# Patient Record
Sex: FEMALE | Race: WHITE | NOT HISPANIC OR LATINO | Employment: FULL TIME | URBAN - METROPOLITAN AREA
[De-identification: names, ages, dates, MRNs, and addresses within clinical notes are randomized per-mention and may not be internally consistent; named-entity substitution may affect disease eponyms.]

---

## 2017-01-04 ENCOUNTER — OFFICE VISIT - RIVER FALLS (OUTPATIENT)
Dept: FAMILY MEDICINE | Facility: CLINIC | Age: 37
End: 2017-01-04

## 2017-01-04 ASSESSMENT — MIFFLIN-ST. JEOR: SCORE: 1374.38

## 2017-06-02 ENCOUNTER — OFFICE VISIT - RIVER FALLS (OUTPATIENT)
Dept: FAMILY MEDICINE | Facility: CLINIC | Age: 37
End: 2017-06-02

## 2017-06-02 ASSESSMENT — MIFFLIN-ST. JEOR: SCORE: 1357.14

## 2017-12-21 ENCOUNTER — OFFICE VISIT - RIVER FALLS (OUTPATIENT)
Dept: FAMILY MEDICINE | Facility: CLINIC | Age: 37
End: 2017-12-21

## 2018-02-23 ENCOUNTER — OFFICE VISIT - RIVER FALLS (OUTPATIENT)
Dept: FAMILY MEDICINE | Facility: CLINIC | Age: 38
End: 2018-02-23

## 2018-02-23 ASSESSMENT — MIFFLIN-ST. JEOR: SCORE: 1373.13

## 2018-03-09 ENCOUNTER — OFFICE VISIT - RIVER FALLS (OUTPATIENT)
Dept: FAMILY MEDICINE | Facility: CLINIC | Age: 38
End: 2018-03-09

## 2022-02-11 VITALS
HEIGHT: 68 IN | TEMPERATURE: 97.8 F | SYSTOLIC BLOOD PRESSURE: 102 MMHG | DIASTOLIC BLOOD PRESSURE: 68 MMHG | HEART RATE: 72 BPM | WEIGHT: 146.8 LBS | BODY MASS INDEX: 22.25 KG/M2

## 2022-02-12 VITALS
HEIGHT: 68 IN | WEIGHT: 143 LBS | BODY MASS INDEX: 21.67 KG/M2 | DIASTOLIC BLOOD PRESSURE: 60 MMHG | TEMPERATURE: 98 F | SYSTOLIC BLOOD PRESSURE: 98 MMHG | HEART RATE: 72 BPM

## 2022-02-12 VITALS
BODY MASS INDEX: 22.34 KG/M2 | TEMPERATURE: 98.2 F | SYSTOLIC BLOOD PRESSURE: 94 MMHG | HEIGHT: 68 IN | WEIGHT: 147.4 LBS | DIASTOLIC BLOOD PRESSURE: 56 MMHG | HEART RATE: 68 BPM

## 2022-02-12 VITALS — TEMPERATURE: 99 F

## 2022-02-15 NOTE — PROGRESS NOTES
Chief Complaint    c/o feels like there is something stuck in her throat x 2 months  History of Present Illness      Patient comes in for a couple of reasons.  First for the last 2 months she is feels like there is something stuck in the back of her throat.  It is only on the right side and it appears to be in the tonsillar region.  She has no trouble swallowing per se.  But hard foods tend to be worse than soft foods.  She does not recall injuring it at all.  She has not been ill.       In addition to this her PHQ 9 is 13.  She works 12 hour shifts at [employer] and rotates between days and nights.  This affects her sleep cycle.  I think this is a major factor contributing to this.  Review of Systems      Review of systems is negative for any fever chills night sweats, weight gain or weight loss.      She has no homicidal or suicidal ideation.  Physical Exam   Vitals & Measurements    T: 98.2(Tympanic)  HR: 68(Peripheral)  BP: 94/56     HT: 67.75 in  WT: 147.4 lb  BMI: 22.58       Physical exam shows vital signs stable afebrile no apparent distress.      HEENT shows TMs clear nose clear pharynx moist.  Where she has sores on the posterior tonsillar bed.  I do not see anything unusual here.       Neck supple       Lungs clear       CV regular       Psychiatric shows more sleep deprivation and depression  Assessment/Plan       Dysphagia         Because this is gone on for 2 months and has not resolved we will get her set up to see Dr. Mann in [place].       Insomnia         We will have her use some melatonin 5 mg and try and regulate her sleep cycle.  She will let me know how this works.        More than half of the 25 minute visit was spent coordinating care and counseling on the above problems.       Orders:  Patient Information     Name:GURPREET ARREDONDO      Address:       [address] [place] [id number]     Sex:Female     YOB: 1980     [phone]     MRN:[id number]      FIN:[id number]     Location:[facility]     Date of Service:02/23/2018      Primary Care Physician:       Keven CHRISTINA [place], [phone]  Problem List/Past Medical History    Ongoing     No qualifying data    Historical     Childbirth      Comments: X 2     Pregnancy     Pregnancy     Pregnancy  Procedure/Surgical History     right hip surgery (03/01/2016)     Vaginal delivery (01/05/2008)     Vaginal delivery (10/28/2003)     Vaginal delivery (04/30/2001)  Medications        ibuprofen: PRN: as needed for pain, 0 Refill(s).                Allergies    No Known Medication Allergies  Social History    Smoking Status - 02/23/2018     Never smoker     Alcohol - Current, 03/19/2013      1-2 times per month, 1 drinks/episode average.     Employment and Education - 03/19/2013      [employer] assistant/ [employer]     Exercise and Physical Activity - Regular exercise, 03/19/2013      Exercise frequency: 3-4 times/week. Exercise type: Bicycling, Running, Swimming, Weight lifting.     Home and Environment - 03/19/2013      Marital status: . Spouse/Partner name: Toño. Lives with Children, Spouse. 3 children. Living situation: Home/Independent. Smoker in household: No.     Nutrition and Health - 03/19/2013      Type of diet: Regular. Caffeine intake amount: 3 Cups of Coffee a week.     Other - 03/19/2013      First menses age 10. Regular menses, duation 4 days. Cycle interval 28 days. No history of abnormal pap smear.     Sexual - 03/19/2013      Sexually active: Yes. Number of current partners: 1. Sexual orientation: Heterosexual. Uses condoms: No. Other contraceptive use: Vasectomy for .     Substance Abuse - Denies Substance Abuse, 03/19/2013      Never     Tobacco - Denies Tobacco Use, 03/19/2013      Never  Family History    CA - Breast cancer: Aunt (P).    Diabetes mellitus type 2: Grandfather (P).    Hypercholesterolemia: Father.    Hypothyroidism: Mother.    Obesity:  Mother.  Immunizations      Vaccine Date Status Comments      tetanus/diphth/pertuss (Tdap) adult/adol 07/14/2016 Given [7/14/2016] Pt consented      Td 10/13/2005 Recorded  Lab Results      Results (Last 90 days)      No results located.

## 2022-02-15 NOTE — NURSING NOTE
Phone Message    PCP:   CHT      Time of Call:  1:12 pm       Person Calling:  pt  Phone number:  [phone]    Returned call at: 1:35 pm    Note:   Pt calls wondering if she has had the Hep B vaccine series here at the clinic. LM notifying her that we don't have that she has had these.

## 2022-02-15 NOTE — PROGRESS NOTES
Patient:   GURPREET ARREDONDO            MRN: [id number]            FIN: [id number]               Age:   36 years     Sex:  Female     :  1980   Associated Diagnoses:   None   Author:   Adán Baca MD       -   Today's date:  2017 10:12:00 AM .        -   To whom it may concern:        This patient is currently under my care and Was seen in my office on  2017.  .     Please excuse him/ her from work, 2017.  He/ she may return to work, on  6/3/2017, Friday night.  Follow up as needed   Please contact me if you have any questions or concerns.      -   Sincerely,             Adán Baca MD  [address]  [address] [phone]

## 2022-02-15 NOTE — TELEPHONE ENCOUNTER
---------------------  From: Latrice Yates CMA (Phone Messages Pool ([id number]_[place] Maliha Engel))   To: Adán Baca MD;     Sent: 2/20/2019 1:42:20 PM CST  Subject: phone message      PCP:   CHT      Time of Call:  140pm       Person Calling:  Jazmine  Phone number:  [phone]    Returned call at:     Note:   patient would like a call back from T to discuss a matter about herself. please advise    Last office visit and reason:       Pharmacy:     [facility] to: T  ** Submitted: **  Order:azithromycin (azithromycin 250 mg oral tablet)  1 packet(s)  PO  Once  as directed on package labeling  Qty:  6 tab(s)        Refills:  0          Substitutions Allowed     Route To Pharmacy - [facility] [id number] [facility]    Signed by Adán Baca MD  2/20/2019 2:46:00 PM---------------------  From: Adán Baca MD   To: Phone Messages Pool ([id number]_DAVE Engel);     Sent: 2/20/2019 2:46:37 PM CST  Subject: RE: phone message      Will come in if not resolving .noted

## 2022-02-15 NOTE — PROGRESS NOTES
Patient:   GURPREET ARREDONDO            MRN: [id number]            FIN: [id number]               Age:   36 years     Sex:  Female     :  1980   Associated Diagnoses:   Preop testing; Femoral hernia   Author:   Gonzalo Pittman PA-C      Preoperative Information   Indication for surgery   Femoral hernia. Present since last pregnancy. Active person. More symptoms with activity.   Accompanied by   Source of history          Chief Complaint      Review of Systems   Constitutional:  Negative.    Eye:  Negative.    Ear/Nose/Mouth/Throat:  Negative.    Respiratory:  Negative.    Cardiovascular:  Negative.    Gastrointestinal:  Negative.    Genitourinary:  Negative.    Hematology/Lymphatics:  Negative.    Endocrine:  Negative.    Immunologic:  Negative.    Musculoskeletal:  Negative.    Integumentary:  Negative.    Neurologic:  Negative.    Psychiatric:  Negative.    All other systems reviewed and negative      Health Status   Allergies:    Allergic Reactions (Selected)  No known allergies   Medications:  (Selected)   Documented Medications  Documented  ibuprofen: PRN: as needed for pain, 0 Refill(s), Type: Maintenance   Problem list:    All Problems  Resolved: Childbirth / SNOMED CT 7551597112  Resolved: Pregnancy / SNOMED CT 633598004  Resolved: Pregnancy / SNOMED CT 695315878  Resolved: Pregnancy / SNOMED CT 786278790      Histories   Past Medical History:    Resolved  Childbirth (7829839589):  Resolved.  Comments:  2010 CDT 2:14 PM CDT - Maryan Clinton  X 2  Pregnancy (057284769):  Resolved on 2001 at 20 years.  Pregnancy (633062935):  Resolved on 10/28/2003 at 23 years.  Pregnancy (619012458):  Resolved on 2008 at 27 years.   Family History:    CA - Breast cancer  Aunt (P)  Hypothyroidism  Mother  Diabetes mellitus type 2  Grandfather (P)  Hypercholesterolemia  Father  Obesity  Mother     Procedure history:    right hip surgery on 3/1/2016 at 35 Years.  Vaginal delivery (650) on 2008 at 27  Years.  Vaginal delivery (650) on 10/28/2003 at 23 Years.  Vaginal delivery (650) on 4/30/2001 at 20 Years.   Social History:        Alcohol Assessment: Current            1-2 times per month, 1 drinks/episode average.      Tobacco Assessment: Denies Tobacco Use            Never      Substance Abuse Assessment: Denies Substance Abuse            Never      Employment and Education Assessment            [employer] assistant/ x-ray tech      Home and Environment Assessment            Marital status: .  Spouse/Partner name: Toño.  Lives with Children, Spouse.  3 children.  Living               situation: Home/Independent.  Smoker in household: No.      Nutrition and Health Assessment            Type of diet: Regular.  Caffeine intake amount: 3 Cups of Coffee a week.      Exercise and Physical Activity Assessment: Regular exercise            Exercise frequency: 3-4 times/week.  Exercise type: Bicycling, Running, Swimming, Weight lifting.      Sexual Assessment            Sexually active: Yes.  Number of current partners: 1.  Sexual orientation: Heterosexual.  Uses condoms: No.               Other contraceptive use: Vasectomy for .      Other Assessment            First menses age 10.  Regular menses, duation 4 days.  Cycle interval 28 days.  No history of abnormal pap               smear.       Has a / no history of anemia.  Has a / no history of DVT or pulmonary embolism.  Has a / no personal history of bleeding problems.   Has a / no personal or family history of anesthesia reactions.  Patient does / does not have active tuberculosis.    S/he has / has not taken aspirin or aspirin containing products in the last week.     S/he has / has not taken Plavix (Clopidogrel) in the last 2 weeks.    S/he has / has not taken warfarin in the past week.    S/he has / has not been on corticosteroids for more than 2 weeks recently.      S/he  is not DNR before, during or after surgery.    Chest pain / SOB walking up 2  flights of steps? NO  Pain in neck or jaw? NO   CAD MI?  NO  Afib? NO  Heart Failure?NO  Asthma  or Bronchitis? NO  Diabetes? NO      Seizure Disorder? NO  CKD? NO  Thyroid Disease? NO  Liver Disease NO  CVA? NO   No TAMRA      Physical Examination   Vital Signs   1/4/2017 8:01 AM CST Temperature Temporal 97.8 DegF    Peripheral Pulse Rate 72 bpm    Pulse Site Radial artery    Systolic Blood Pressure 102 mmHg    Diastolic Blood Pressure 68 mmHg    Mean Arterial Pressure 79 mmHg    BP Site Left arm    BP Method Manual      Measurements from flowsheet : Measurements   1/4/2017 8:01 AM CST Height Measured - Standard 68 in    Weight Measured - Standard 146.8 lb    BSA 1.79 m2    Body Mass Index 22.32 kg/m2      General:  Alert and oriented, No acute distress.    Eye:  Pupils are equal, round and reactive to light, Extraocular movements are intact, Normal conjunctiva.    HENT:  Normocephalic, Tympanic membranes are clear, Oral mucosa is moist, No pharyngeal erythema.    Neck:  Supple, Non-tender, No lymphadenopathy.    Respiratory:  Lungs are clear to auscultation, Respirations are non-labored, Breath sounds are equal.    Cardiovascular:  Normal rate, Regular rhythm, No murmur.    Gastrointestinal:  Soft, Non-tender, Non-distended, Normal bowel sounds, No organomegaly.    Genitourinary:  No costovertebral angle tenderness.    Integumentary:  No rash.    Neurologic:  No focal deficits, Normal deep tendon reflexes.    Psychiatric:  Cooperative, Appropriate mood & affect.       Review / Management              Impression and Plan   Diagnosis     Preop testing (TMR68-QW Z01.818).     Femoral hernia (QWS27-UW K41.90).     Condition:  Stable.    No known contraindications to the planned surgical procedure.

## 2022-02-15 NOTE — PROGRESS NOTES
Patient:   GURPREET ARREDONDO            MRN: [id number]            FIN: [id number]               Age:   37 years     Sex:  Female     :  1980   Associated Diagnoses:   None   Author:   Reggie Mann MD      Visit Information      Primary Care Provider (PCP):  Adán Baca MD    NPI# [id number]      Referring Provider:  Reggie Mann MD    NPI# [id number]      Chief Complaint   3/9/2018 3:40 PM CST     Feels like she has something stuck in throat all the time.        History of Present Illness   Asked to see by Dr. Baca for evaluation of her throat.  The patient feels like there is something in the throat.  It has been there perhaps since last August.  She feels like someone is squeezing the throat.  It feels like something hard in the throat.  It feels like it is there all of the time and is always in the same spot.  No triggering events.  Food goes down normally.  It is less noticeable after eating.  No change in voice.  Symptoms are worse with working out.        Review of Systems   Constitutional:  Negative.    Ear/Nose/Mouth/Throat:  Negative except as documented in history of present illness.    Respiratory:  Negative.       Health Status   Allergies:    Allergic Reactions (Selected)  No Known Medication Allergies   Medications:  (Selected)   Documented Medications  Documented  ibuprofen: PRN: as needed for pain, 0 Refill(s), Type: Maintenance      Histories   Past Medical History:    Resolved  Childbirth (8839002919):  Resolved.  Comments:  2010 CDT 2:14 PM CDT - Maryan Clinton 2  Pregnancy (547790496):  Resolved on 2001 at 20 years.  Pregnancy (550896657):  Resolved on 10/28/2003 at 23 years.  Pregnancy (059879117):  Resolved on 2008 at 27 years.   Family History:    CA - Breast cancer  Aunt (P)  Hypothyroidism  Mother  Diabetes mellitus type 2  Grandfather (P)  Hypercholesterolemia  Father  Obesity  Mother     Procedure history:    right hip surgery on 3/1/2016 at 35  Years.  Vaginal delivery (650) on 1/5/2008 at 27 Years.  Vaginal delivery (650) on 10/28/2003 at 23 Years.  Vaginal delivery (650) on 4/30/2001 at 20 Years.      Physical Examination   Vital Signs   3/9/2018 3:40 PM CST     Temperature Tympanic      99.0 DegF     CONSTITUTIONAL  General Appearance:  Normal, well developed, well nourished, no obvious distress  Ability to Communicate:  communicates appropriately.    HEAD AND FACE  Appearance and Symmetry:  Normal, no scalp or facial scarring or suspicious lesions.  Paranasal sinuses tenderness:  Normal, Paranasal sinuses non tender    EARS  Clinical speech reception threshold:  Normal, able to hear normal speech.  Auricle:  Normal, Auricles without scars, lesions, masses.  External auditory canal:  Normal, External auditory canal normal.  Tympanic membrane:  Normal, Tympanic membranes normal without swelling or erythema.  Tympanic membrane mobility:  Normal, Normal tympanic membrane mobility.    NOSE (speculum or scope)  Architecture:  Normal, Grossly normal external nasal architecture with no masses or lesions.  Mucosa:  Normal mucosa, No polyps or masses.  Septum:  Normal, Septum non-obstructing.  Turbinates:  Normal, No turbinate abnormalities    ORAL CAVITY AND OROPHARYNX  Lips:  Normal.  Dental and gingiva:  Normal, No obvious dental or gingival disease.  Mucosa:  Normal, Moist mucous membranes.  Tongue:  Normal, Tongue mobile with no mucosal abnormalities  Hard and soft palate:  Normal, Hard and soft palate without cleft or mucosal lesions.  Oral pharynx:  Normal, Posterior pharynx without lesions or remarkable asymmetry.  Saliva:  Normal, Clear saliva.  Masses:  Normal, No palpable masses or pathologically enlarged lymph nodes.    LARYNGOSCOPY  Risks and benefit of Flexible laryngeal endoscopy were discussed with the patient and they wished to proceed.  The nose was sprayed with 4% lidocaine / 1% phenylephrine.  After allowing adequate time for anesthesia the  procedure was started.  Patient tolerated the procedure well.  See exam note for findings.    LARYNX AND HYPOPHARYNX (mirror or scope)  Voice Quality:  Normal voice quality.  Supra glottis:  Normal, No edema or erythema.  Epiglottis:  Normal, No epiglottic mass or mucosal lesions.  Pyriform sinuses:  Normal, Pyriform sinuses clear.  Vocal cords appearance:  Normal, Vocal cord motion symmetric.  Mild erythema of the arytenoids  Vocal cord motion:  Normal, Vocal cord motion symmetric  Endolarynx:  Normal, No Endolaryngeal mass or mucosal lesions.    NECK  Masses/lymph nodes:  Normal, No worrisome neck masses or lymph nodes.  She reports that the discomfort is in the area of the right upper neck.  When I palpate the right hyoid, she confirms that that is the location of the sensation.  Palpation of the hyoid in the pharynx is also unremarkable.  Salivary glands:  Normal, Parotid and submandibular glands.  Trachea and larynx position:  Normal, Trachea and larynx midline.  Thyroid:  Normal, No thyroid abnormality.  Tenderness:  Normal, No cervical tenderness.  Suppleness:  Normal, Neck supple    NEUROLOGICAL  Speech pattern:  Normal, Proasaic    RESPIRATORY  Symmetry and Respiratory effort:  Normal, Symmetric chest movement and expansion with no increased intercostal retractions or use of accessory muscles.       Impression and Plan   The fact that her discomfort is worse with exercise suggests that there may be a musculoskeletal component of the pain from the muscular connections to the right hyoid.  I advised NSAIDS and heating pad.  If the symptoms worsene over time then she will need a CT soft tissue neck with contrast.

## 2022-02-15 NOTE — PROGRESS NOTES
Patient:   GURPREET ARREDONDO            MRN: [id number]            FIN: [id number]               Age:   36 years     Sex:  Female     :  1980   Associated Diagnoses:   Left acute suppurative otitis media   Author:   Adán aBca MD      Visit Information      Date of Service: 2017 09:56 am  Performing Location: [facility]  Encounter#: [id number]      Primary Care Provider (PCP):  Adán Baca MD    NPI# [id number]      Referring Provider:  Adán Baca MD    NPI# [id number]      Chief Complaint   2017 9:57 AM CDT     c/o cold, congestion x 1 week; L ear pain, drainage started 2 days     Chief complaint and symptoms noted above confirmed with patient.      History of Present Illness             The patient presents with earache.  The location of the earache is the left ear.  The earache is characterized by pain, burning and discharge.  The severity of the earache is moderate.  The earache is constant.  The earache has lasted for 2 day(s).  Exacerbating factors consist of none.  Relieving factors consist of analgesics.        Review of Systems   Constitutional:  Negative.    Ear/Nose/Mouth/Throat:  Negative except as documented in history of present illness.    Respiratory:  Cough.    Cardiovascular:  Negative.    Gastrointestinal:  Negative.       Health Status   Allergies:    Allergic Reactions (Selected)  No Known Medication Allergies   Medications:  (Selected)   Documented Medications  Documented  ibuprofen: PRN: as needed for pain, 0 Refill(s), Type: Maintenance      Histories   Past Medical History:    Resolved  Childbirth (SNOMED CT 1307913707):  Resolved.  Comments:  2010 CDT 2:14 PM CDT - Maryan Clinton  X 2  Pregnancy (SNOMED CT 592664584):  Resolved on 2001 at 20 years.  Pregnancy (SNOMED CT 443203930):  Resolved on 10/28/2003 at 23 years.  Pregnancy (SNOMED CT 886358209):  Resolved on 2008 at 27 years.   Family History:    CA - Breast  cancer  Aunt (P)  Hypothyroidism  Mother  Diabetes mellitus type 2  Grandfather (P)  Hypercholesterolemia  Father  Obesity  Mother     Procedure history:    right hip surgery on 3/1/2016 at 35 Years.  Vaginal delivery (ICD-9-) on 1/5/2008 at 27 Years.  Vaginal delivery (ICD-9-) on 10/28/2003 at 23 Years.  Vaginal delivery (ICD-9-) on 4/30/2001 at 20 Years.   Social History:        Alcohol Assessment: Current            1-2 times per month, 1 drinks/episode average.      Tobacco Assessment: Denies Tobacco Use            Never      Substance Abuse Assessment: Denies Substance Abuse            Never      Employment and Education Assessment            Chiro assistant/ x-ray tech      Home and Environment Assessment            Marital status: .  Spouse/Partner name: Toño.  Lives with Children, Spouse.  3 children.  Living               situation: Home/Independent.  Smoker in household: No.      Nutrition and Health Assessment            Type of diet: Regular.  Caffeine intake amount: 3 Cups of Coffee a week.      Exercise and Physical Activity Assessment: Regular exercise            Exercise frequency: 3-4 times/week.  Exercise type: Bicycling, Running, Swimming, Weight lifting.      Sexual Assessment            Sexually active: Yes.  Number of current partners: 1.  Sexual orientation: Heterosexual.  Uses condoms: No.               Other contraceptive use: Vasectomy for .      Other Assessment            First menses age 10.  Regular menses, duation 4 days.  Cycle interval 28 days.  No history of abnormal pap               smear.        Physical Examination   Vital Signs   6/2/2017 9:57 AM CDT Temperature Tympanic 98 DegF    Peripheral Pulse Rate 72 bpm    Pulse Site Radial artery    HR Method Manual    Systolic Blood Pressure 98 mmHg    Diastolic Blood Pressure 60 mmHg    Mean Arterial Pressure 73 mmHg    BP Site Left arm    BP Method Manual      General:  Alert and oriented, No acute  distress.    Eye:  Normal conjunctiva.    HENT:  Normocephalic.         Ear: Left ear, Tympanic membrane ( Erythematous ).    Neck:  Supple, Non-tender.    Respiratory:  Lungs are clear to auscultation, Respirations are non-labored.    Cardiovascular:  Normal rate, Regular rhythm.       Impression and Plan   Diagnosis     Left acute suppurative otitis media (SCO22-ER H66.002).     Course:  Not improving.    Orders     Orders   Pharmacy:  azithromycin 500 mg oral tablet (Prescribe): 1 tab(s) ( 500 mg ), PO, Daily, x 3 day(s), # 3 tab(s), 0 Refill(s), Type: Maintenance, Pharmacy: [id number], 1 tab(s) po daily,x3 day(s).     Orders     F/U  if not improving, sooner if getting worse.

## 2023-02-24 ENCOUNTER — OFFICE VISIT (OUTPATIENT)
Dept: FAMILY MEDICINE | Facility: CLINIC | Age: 43
End: 2023-02-24
Payer: COMMERCIAL

## 2023-02-24 VITALS
TEMPERATURE: 98.5 F | DIASTOLIC BLOOD PRESSURE: 64 MMHG | OXYGEN SATURATION: 98 % | WEIGHT: 148 LBS | BODY MASS INDEX: 22.43 KG/M2 | SYSTOLIC BLOOD PRESSURE: 100 MMHG | HEIGHT: 68 IN | RESPIRATION RATE: 16 BRPM | HEART RATE: 82 BPM

## 2023-02-24 DIAGNOSIS — J02.9 ACUTE PHARYNGITIS, UNSPECIFIED ETIOLOGY: Primary | ICD-10-CM

## 2023-02-24 DIAGNOSIS — Z12.4 PAP SMEAR FOR CERVICAL CANCER SCREENING: ICD-10-CM

## 2023-02-24 LAB
DEPRECATED S PYO AG THROAT QL EIA: NEGATIVE
GROUP A STREP BY PCR: NOT DETECTED

## 2023-02-24 PROCEDURE — 99203 OFFICE O/P NEW LOW 30 MIN: CPT | Performed by: FAMILY MEDICINE

## 2023-02-24 PROCEDURE — 87624 HPV HI-RISK TYP POOLED RSLT: CPT | Performed by: FAMILY MEDICINE

## 2023-02-24 PROCEDURE — G0145 SCR C/V CYTO,THINLAYER,RESCR: HCPCS | Performed by: FAMILY MEDICINE

## 2023-02-24 PROCEDURE — 87651 STREP A DNA AMP PROBE: CPT | Performed by: FAMILY MEDICINE

## 2023-02-24 NOTE — PROGRESS NOTES
"  Assessment & Plan     Acute pharyngitis, unspecified etiology  Not improving  - Streptococcus A Rapid Screen w/Reflex to PCR - Clinic Collect      Pap Smear     Return if symptoms worsen or fail to improve.    Adán Baca MD  [facility]    Swetha Lucero is a 42 year old, presenting for the following health issues:  Throat Problem (Object stuck?)      HPI     Onset: Ongoing issue  Description: Feels like is an obstruction, at the top her throat, on and off for months now.  Worse at other times than others.  Not painful, and no difficulty swallowing.          Review of Systems   Constitutional, HEENT, cardiovascular, pulmonary, gi and gu systems are negative, except as otherwise noted.      Objective    /64   Pulse 82   Temp 98.5  F (36.9  C)   Resp 16   Ht 1.727 m (5' 8\")   Wt 67.1 kg (148 lb)   LMP 02/14/2023 (Approximate)   SpO2 98%   BMI 22.50 kg/m    Body mass index is 22.5 kg/m .  Physical Exam   GENERAL: healthy, alert and no distress  NECK: no adenopathy, no asymmetry, masses, or scars and thyroid normal to palpation  RESP: lungs clear to auscultation - no rales, rhonchi or wheezes  CV: regular rate and rhythm, normal S1 S2, no S3 or S4, no murmur, click or rub, no peripheral edema and peripheral pulses strong  ABDOMEN: soft, nontender, no hepatosplenomegaly, no masses and bowel sounds normal   (female): normal female external genitalia, normal urethral meatus, vaginal mucosa, normal cervix/adnexa/uterus without masses or discharge Pap smear obtained.    MS: no gross musculoskeletal defects noted, no edema    Recent Results (from the past 240 hour(s))   Streptococcus A Rapid Screen w/Reflex to PCR - Clinic Collect    Collection Time: 02/24/23  1:31 PM    Specimen: Throat; Swab   Result Value Ref Range    Group A Strep antigen Negative Negative                     "

## 2023-02-28 LAB
BKR LAB AP GYN ADEQUACY: NORMAL
BKR LAB AP GYN INTERPRETATION: NORMAL
BKR LAB AP HPV REFLEX: NORMAL
BKR LAB AP LMP: NORMAL
BKR LAB AP PREVIOUS ABNORMAL: NORMAL
PATH REPORT.COMMENTS IMP SPEC: NORMAL
PATH REPORT.COMMENTS IMP SPEC: NORMAL
PATH REPORT.RELEVANT HX SPEC: NORMAL

## 2023-03-01 LAB
HUMAN PAPILLOMA VIRUS 16 DNA: NEGATIVE
HUMAN PAPILLOMA VIRUS 18 DNA: NEGATIVE
HUMAN PAPILLOMA VIRUS FINAL DIAGNOSIS: NORMAL
HUMAN PAPILLOMA VIRUS OTHER HR: NEGATIVE

## 2023-07-18 ENCOUNTER — OFFICE VISIT (OUTPATIENT)
Dept: FAMILY MEDICINE | Facility: CLINIC | Age: 43
End: 2023-07-18
Payer: COMMERCIAL

## 2023-07-18 VITALS
WEIGHT: 153 LBS | TEMPERATURE: 98.2 F | OXYGEN SATURATION: 99 % | RESPIRATION RATE: 16 BRPM | HEART RATE: 57 BPM | DIASTOLIC BLOOD PRESSURE: 66 MMHG | BODY MASS INDEX: 23.26 KG/M2 | SYSTOLIC BLOOD PRESSURE: 102 MMHG

## 2023-07-18 DIAGNOSIS — R22.1 MASS OF RIGHT SIDE OF NECK: Primary | ICD-10-CM

## 2023-07-18 DIAGNOSIS — R13.10 ABNORMAL SWALLOWING: ICD-10-CM

## 2023-07-18 PROCEDURE — 99213 OFFICE O/P EST LOW 20 MIN: CPT | Performed by: FAMILY MEDICINE

## 2023-07-18 NOTE — PROGRESS NOTES
Assessment & Plan     Mass of right side of neck  Patient with vague sensation when she swallows in the right side of her neck with findings on exam.  Of an enlarged submandibular gland on the right we will set her up for CT and ENT referral  - Adult ENT  Referral; Future  - CT Soft Tissue Neck w Contrast; Future    Abnormal swallowing    - Adult ENT  Referral; Future  - CT Soft Tissue Neck w Contrast; Future                 Trey Elam MD  [facility] - [place]    Swetha Lucero is a 42 year old, presenting for the following health issues: She has sensation over her right submandibular area when she swallows that something is different there.  No other complaints  RECHECK (Has not resolved and would like referral to ENT.)        7/18/2023     8:04 AM   Additional Questions   Roomed by Annie     History of Present Illness       Reason for visit:  Feels like somethings stuck in throat    She eats 2-3 servings of fruits and vegetables daily.She consumes 0 sweetened beverage(s) daily.She exercises with enough effort to increase her heart rate 30 to 60 minutes per day.  She exercises with enough effort to increase her heart rate 5 days per week.   She is taking medications regularly.               Review of Systems   Constitutional, HEENT, cardiovascular, pulmonary, gi and gu systems are negative, except as otherwise noted.      Objective    /66 (BP Location: Right arm, Patient Position: Sitting, Cuff Size: Adult Regular)   Pulse 57   Temp 98.2  F (36.8  C) (Temporal)   Resp 16   Wt 69.4 kg (153 lb)   SpO2 99%   BMI 23.26 kg/m    Body mass index is 23.26 kg/m .  Physical Exam   Patient alert no apparent distress pharynx was benign neck is supple she is got some fullness over her right submandibular region which is larger than the left side.

## 2023-07-24 NOTE — PROGRESS NOTES
History of Present Illness - Jazmine Snider is a very pleasant 42 year old female here to see me for the first time for a RIGHT neck mass and dysphagia.    She tells me that in the fall of 2022 she started to get the feeling that she always needed to swallow and a foreign body sensation in the throat.  She tried some simple home remedies as well.  The feeling has persisted this whole time.    No changes in eating or drinking, no coughing up blood, no change in voice.    No history of any ENT surgery, she does report that she has had tonsil stones in the past.    A CT scan has not yet been done      Past Medical History - There is no problem list on file for this patient.      Current Medications - No current outpatient medications on file.    Allergies - No Known Allergies    Social History -   Social History     Socioeconomic History    Marital status:    Tobacco Use    Smoking status: Never    Smokeless tobacco: Never   Vaping Use    Vaping Use: Never used       Family History - No family history on file.    Review of Systems - As per HPI and PMHx, otherwise 10+ system review of the head and neck, and general constitution is negative.    Physical Exam  /77   Pulse 74   Wt 67.6 kg (149 lb)   BMI 22.66 kg/m        General - The patient is well nourished and well developed, and appears to have good nutritional status.  Alert and oriented to person and place, answers questions and cooperates with examination appropriately.   Head and Face - Normocephalic and atraumatic, with no gross asymmetry noted of the contour of the facial features.  The facial nerve is intact, with strong symmetric movements.  Voice and Breathing - The patient was breathing comfortably without the use of accessory muscles. There was no wheezing, stridor, or stertor.  The patients voice was clear and strong, and had appropriate pitch and quality.  Ears - The tympanic membranes are normal in appearance, bony landmarks are intact.   No retraction, perforation, or masses.  No fluid or purulence was seen in the external canal or the middle ear. No evidence of infection of the middle ear or external canal, cerumen was normal in appearance.  Eyes - Extraocular movements intact, and the pupils were reactive to light.  Sclera were not icteric or injected, conjunctiva were pink and moist.  Mouth - Examination of the oral cavity showed pink, healthy oral mucosa. No lesions or ulcerations noted.  The tongue was mobile and midline, and the dentition were in good condition.    Throat - The walls of the oropharynx were smooth, pink, moist, symmetric, and had no lesions or ulcerations.  The tonsillar pillars and soft palate were symmetric.  The uvula was midline on elevation.    Neck - Normal midline excursion of the laryngotracheal complex during swallowing.  Full range of motion on passive movement.  Palpation of the occipital, submental, submandibular, internal jugular chain, and supraclavicular nodes did not demonstrate any abnormal lymph nodes or masses.  The carotid pulse was palpable bilaterally.  Palpation of the thyroid was soft and smooth, with no nodules or goiter appreciated.  The trachea was mobile and midline.  Nose - External contour is symmetric, no gross deflection or scars.  Nasal mucosa is pink and moist with no abnormal mucus.  The septum was midline and non-obstructive, turbinates of normal size and position.  No polyps, masses, or purulence noted on examination.    Flexible Endoscopy -    Attempts at mirror laryngoscopy were not possible due to gag reflex.  Therefore I proceeded with a fiberoptic examination.  First I sprayed both sides of the nose with a mixture of lidocaine and neosynephrine.  I then passed the scope through the nasal cavity.  Color photographs were taken for the permanent medical record.  The nasal cavity was unremarkable.  The nasopharynx was mucosally covered and symmetric.  The Eustachian tube openings were  unobstructed.  Going further down I had a clear view of the base of tongue, which had normal appearing lingual tonsillar tissue.  The base of tongue was free of lesions, and the vallecula was open.  The epiglottis was smooth and mucosally covered.  The supraglottic larynx was then clearly visualized.  The vocal cords moved smoothly and symmetrically, they were slightly edematous but pearly white and no lesions were seen.  I did note a significant amount of edema and redundant mucosa in the interarytenoid soft tissues and posterior surface of the larynx.  The pyriform sinuses were open, and the limited view of the postcricoid region did not show any erosive or mass lesions.    RIGHT Base of tongue          Laryngeal erythema and edema      A/P - Jazmine Snider is a 42 year old female  (K21.9) LPRD (laryngopharyngeal reflux disease)  (primary encounter diagnosis)  (R22.1) Mass of right side of neck  (R13.10) Abnormal swallowing    In this case, I think she actually has laryngopharyngeal reflux and will treat with one month or more of Omeprazole.  If that has no effect at all on her foreign body sensation then contact me, and we should then discuss a CT scan of the neck soft tissues.    As a side thought, it is possible given the appearance of her tonsils, that she is feeling tonsilliths.

## 2023-07-27 ENCOUNTER — OFFICE VISIT (OUTPATIENT)
Dept: OTOLARYNGOLOGY | Facility: CLINIC | Age: 43
End: 2023-07-27
Payer: COMMERCIAL

## 2023-07-27 VITALS
BODY MASS INDEX: 22.66 KG/M2 | WEIGHT: 149 LBS | SYSTOLIC BLOOD PRESSURE: 120 MMHG | HEART RATE: 74 BPM | DIASTOLIC BLOOD PRESSURE: 77 MMHG

## 2023-07-27 DIAGNOSIS — R22.1 MASS OF RIGHT SIDE OF NECK: ICD-10-CM

## 2023-07-27 DIAGNOSIS — K21.9 LPRD (LARYNGOPHARYNGEAL REFLUX DISEASE): Primary | ICD-10-CM

## 2023-07-27 DIAGNOSIS — R13.10 ABNORMAL SWALLOWING: ICD-10-CM

## 2023-07-27 PROCEDURE — 99203 OFFICE O/P NEW LOW 30 MIN: CPT | Mod: 25 | Performed by: OTOLARYNGOLOGY

## 2023-07-27 PROCEDURE — 31575 DIAGNOSTIC LARYNGOSCOPY: CPT | Performed by: OTOLARYNGOLOGY

## 2023-07-27 RX ORDER — OMEPRAZOLE 40 MG/1
40 CAPSULE, DELAYED RELEASE ORAL DAILY
Qty: 90 CAPSULE | Refills: 3 | Status: SHIPPED | OUTPATIENT
Start: 2023-07-27

## 2023-07-27 NOTE — LETTER
7/27/2023         RE: Jazmine Snider   [address] [facility] [address]        Dear Colleague,    Thank you for referring your patient, Jazmine Snider, to the [facility]. Please see a copy of my visit note below.    History of Present Illness - Jazmine Snider is a very pleasant 42 year old female here to see me for the first time for a RIGHT neck mass and dysphagia.    She tells me that in the fall of 2022 she started to get the feeling that she always needed to swallow and a foreign body sensation in the throat.  She tried some simple home remedies as well.  The feeling has persisted this whole time.    No changes in eating or drinking, no coughing up blood, no change in voice.    No history of any ENT surgery, she does report that she has had tonsil stones in the past.    A CT scan has not yet been done      Past Medical History - There is no problem list on file for this patient.      Current Medications - No current outpatient medications on file.    Allergies - No Known Allergies    Social History -   Social History     Socioeconomic History     Marital status:    Tobacco Use     Smoking status: Never     Smokeless tobacco: Never   Vaping Use     Vaping Use: Never used       Family History - No family history on file.    Review of Systems - As per HPI and PMHx, otherwise 10+ system review of the head and neck, and general constitution is negative.    Physical Exam  /77   Pulse 74   Wt 67.6 kg (149 lb)   BMI 22.66 kg/m        General - The patient is well nourished and well developed, and appears to have good nutritional status.  Alert and oriented to person and place, answers questions and cooperates with examination appropriately.   Head and Face - Normocephalic and atraumatic, with no gross asymmetry noted of the contour of the facial features.  The facial nerve is intact, with strong symmetric movements.  Voice and Breathing - The patient was breathing comfortably  without the use of accessory muscles. There was no wheezing, stridor, or stertor.  The patients voice was clear and strong, and had appropriate pitch and quality.  Ears - The tympanic membranes are normal in appearance, bony landmarks are intact.  No retraction, perforation, or masses.  No fluid or purulence was seen in the external canal or the middle ear. No evidence of infection of the middle ear or external canal, cerumen was normal in appearance.  Eyes - Extraocular movements intact, and the pupils were reactive to light.  Sclera were not icteric or injected, conjunctiva were pink and moist.  Mouth - Examination of the oral cavity showed pink, healthy oral mucosa. No lesions or ulcerations noted.  The tongue was mobile and midline, and the dentition were in good condition.    Throat - The walls of the oropharynx were smooth, pink, moist, symmetric, and had no lesions or ulcerations.  The tonsillar pillars and soft palate were symmetric.  The uvula was midline on elevation.    Neck - Normal midline excursion of the laryngotracheal complex during swallowing.  Full range of motion on passive movement.  Palpation of the occipital, submental, submandibular, internal jugular chain, and supraclavicular nodes did not demonstrate any abnormal lymph nodes or masses.  The carotid pulse was palpable bilaterally.  Palpation of the thyroid was soft and smooth, with no nodules or goiter appreciated.  The trachea was mobile and midline.  Nose - External contour is symmetric, no gross deflection or scars.  Nasal mucosa is pink and moist with no abnormal mucus.  The septum was midline and non-obstructive, turbinates of normal size and position.  No polyps, masses, or purulence noted on examination.    Flexible Endoscopy -    Attempts at mirror laryngoscopy were not possible due to gag reflex.  Therefore I proceeded with a fiberoptic examination.  First I sprayed both sides of the nose with a mixture of lidocaine and  neosynephrine.  I then passed the scope through the nasal cavity.  Color photographs were taken for the permanent medical record.  The nasal cavity was unremarkable.  The nasopharynx was mucosally covered and symmetric.  The Eustachian tube openings were unobstructed.  Going further down I had a clear view of the base of tongue, which had normal appearing lingual tonsillar tissue.  The base of tongue was free of lesions, and the vallecula was open.  The epiglottis was smooth and mucosally covered.  The supraglottic larynx was then clearly visualized.  The vocal cords moved smoothly and symmetrically, they were slightly edematous but pearly white and no lesions were seen.  I did note a significant amount of edema and redundant mucosa in the interarytenoid soft tissues and posterior surface of the larynx.  The pyriform sinuses were open, and the limited view of the postcricoid region did not show any erosive or mass lesions.    RIGHT Base of tongue          Laryngeal erythema and edema      A/P - Jazmine Snider is a 42 year old female  (K21.9) LPRD (laryngopharyngeal reflux disease)  (primary encounter diagnosis)  (R22.1) Mass of right side of neck  (R13.10) Abnormal swallowing    In this case, I think she actually has laryngopharyngeal reflux and will treat with one month or more of Omeprazole.  If that has no effect at all on her foreign body sensation then contact me, and we should then discuss a CT scan of the neck soft tissues.    As a side thought, it is possible given the appearance of her tonsils, that she is feeling tonsilliths.          Again, thank you for allowing me to participate in the care of your patient.        Sincerely,        Cedric Mauricio MD

## 2025-06-20 ENCOUNTER — ORDERS ONLY (AUTO-RELEASED) (OUTPATIENT)
Dept: FAMILY MEDICINE | Facility: CLINIC | Age: 45
End: 2025-06-20

## 2025-06-20 ENCOUNTER — RESULTS FOLLOW-UP (OUTPATIENT)
Dept: FAMILY MEDICINE | Facility: CLINIC | Age: 45
End: 2025-06-20

## 2025-06-20 DIAGNOSIS — Z12.11 SCREEN FOR COLON CANCER: ICD-10-CM

## 2025-06-20 PROCEDURE — 87389 HIV-1 AG W/HIV-1&-2 AB AG IA: CPT | Performed by: FAMILY MEDICINE

## 2025-06-20 PROCEDURE — 80061 LIPID PANEL: CPT | Performed by: FAMILY MEDICINE

## 2025-06-20 PROCEDURE — 82947 ASSAY GLUCOSE BLOOD QUANT: CPT | Performed by: FAMILY MEDICINE

## 2025-06-20 PROCEDURE — 86803 HEPATITIS C AB TEST: CPT | Performed by: FAMILY MEDICINE

## 2025-06-23 ENCOUNTER — PATIENT OUTREACH (OUTPATIENT)
Dept: CARE COORDINATION | Facility: CLINIC | Age: 45
End: 2025-06-23
Payer: COMMERCIAL

## 2025-06-29 ENCOUNTER — HEALTH MAINTENANCE LETTER (OUTPATIENT)
Age: 45
End: 2025-06-29

## 2025-07-07 ENCOUNTER — TELEPHONE (OUTPATIENT)
Dept: FAMILY MEDICINE | Facility: CLINIC | Age: 45
End: 2025-07-07
Payer: COMMERCIAL

## 2025-07-10 ENCOUNTER — TRANSFERRED RECORDS (OUTPATIENT)
Dept: HEALTH INFORMATION MANAGEMENT | Facility: CLINIC | Age: 45
End: 2025-07-10
Payer: COMMERCIAL